# Patient Record
Sex: MALE | Employment: UNEMPLOYED | URBAN - METROPOLITAN AREA
[De-identification: names, ages, dates, MRNs, and addresses within clinical notes are randomized per-mention and may not be internally consistent; named-entity substitution may affect disease eponyms.]

---

## 2024-05-17 ENCOUNTER — OFFICE VISIT (OUTPATIENT)
Dept: FAMILY MEDICINE | Facility: CLINIC | Age: 44
End: 2024-05-17
Payer: COMMERCIAL

## 2024-05-17 VITALS
WEIGHT: 141.1 LBS | DIASTOLIC BLOOD PRESSURE: 83 MMHG | OXYGEN SATURATION: 98 % | HEART RATE: 64 BPM | SYSTOLIC BLOOD PRESSURE: 126 MMHG | RESPIRATION RATE: 18 BRPM | TEMPERATURE: 97.9 F

## 2024-05-17 DIAGNOSIS — L30.9 DERMATITIS: Primary | ICD-10-CM

## 2024-05-17 DIAGNOSIS — J34.89 INFECTION OF NOSE: ICD-10-CM

## 2024-05-17 PROCEDURE — 99203 OFFICE O/P NEW LOW 30 MIN: CPT | Performed by: PHYSICIAN ASSISTANT

## 2024-05-17 RX ORDER — CEPHALEXIN 500 MG/1
500 CAPSULE ORAL 4 TIMES DAILY
Qty: 28 CAPSULE | Refills: 0 | Status: SHIPPED | OUTPATIENT
Start: 2024-05-17 | End: 2024-05-24

## 2024-05-17 RX ORDER — MUPIROCIN 20 MG/G
OINTMENT TOPICAL 3 TIMES DAILY
Qty: 22 G | Refills: 0 | Status: SHIPPED | OUTPATIENT
Start: 2024-05-17 | End: 2024-05-24

## 2024-05-17 NOTE — PROGRESS NOTES
Assessment & Plan     Dermatitis  Pt has a hx of spongiotic dermatitis with intermittant episodes and pigment changes on his face. He requests referral to dermatology as he has not received any care for years and although no current episode has pigment change on the face he would like recommendations for treating as well as management when he is having acute flares.  Referral placed.     - Adult Dermatology  Referral    Infection of nose  Bactroban and keflex. Avoid picking, pulling hairs, waxing   Nasal saline   Follow-up prn    - mupirocin (BACTROBAN) 2 % external ointment  Dispense: 22 g; Refill: 0  - cephALEXin (KEFLEX) 500 MG capsule  Dispense: 28 capsule; Refill: 0         Shirin Olson PA-C  [facility]    Aye Maya is a 44 year old male who presents to clinic today for the following health issues:  Chief Complaint   Patient presents with    Nose Problem     2 days Right side nose painfully and redness.     HPI: pt felt tenderness and a bump he thought may be a pimple on the L nares several days ago, he squeezed without results.  Since has worsened.   Pt presents with 2 day hx of redness, swelling discomfort L nares.   No nasal discharge.    No fevers.    No uri sx.  Waxed nares 2 weeks ago but ipsilateral nose    Denies additional trauma.      Review of Systems  Constitutional, HEENT, cardiovascular, pulmonary, gi and gu systems are negative, except as otherwise noted.      Objective    /83 (BP Location: Right arm, Patient Position: Sitting, Cuff Size: Adult Regular)   Pulse 64   Temp 97.9  F (36.6  C) (Oral)   Resp 18   Wt 64 kg (141 lb 1.6 oz)   SpO2 98%   Physical Exam   Exam of the nose reveals mild erythema, swelling at the tip of the nares on the L with TTP, no discharge. No perforation of the septum.    Brown macular irregular shaped hyperpigmentation R face including lateral nose, maxillary region, temple

## 2024-06-06 PROBLEM — Z00.00 PREVENTATIVE HEALTH CARE: Status: ACTIVE | Noted: 2024-06-06

## 2024-08-09 ENCOUNTER — HOSPITAL ENCOUNTER (EMERGENCY)
Facility: HOSPITAL | Age: 44
Discharge: LEFT BEFORE DISCHARGE | End: 2024-08-09
Admitting: STUDENT IN AN ORGANIZED HEALTH CARE EDUCATION/TRAINING PROGRAM
Payer: COMMERCIAL

## 2024-08-09 VITALS
DIASTOLIC BLOOD PRESSURE: 78 MMHG | BODY MASS INDEX: 20.9 KG/M2 | HEIGHT: 70 IN | OXYGEN SATURATION: 99 % | WEIGHT: 146 LBS | HEART RATE: 55 BPM | TEMPERATURE: 98.9 F | RESPIRATION RATE: 20 BRPM | SYSTOLIC BLOOD PRESSURE: 127 MMHG

## 2024-08-09 PROCEDURE — 99281 EMR DPT VST MAYX REQ PHY/QHP: CPT

## 2024-08-09 ASSESSMENT — COLUMBIA-SUICIDE SEVERITY RATING SCALE - C-SSRS
2. HAVE YOU ACTUALLY HAD ANY THOUGHTS OF KILLING YOURSELF IN THE PAST MONTH?: NO
1. IN THE PAST MONTH, HAVE YOU WISHED YOU WERE DEAD OR WISHED YOU COULD GO TO SLEEP AND NOT WAKE UP?: NO
6. HAVE YOU EVER DONE ANYTHING, STARTED TO DO ANYTHING, OR PREPARED TO DO ANYTHING TO END YOUR LIFE?: NO

## 2024-08-10 NOTE — ED TRIAGE NOTES
Pt arrives to triage for facial swelling and a chronic dermatological condition. He report she puts alcohol on his face and used a new facial cleanser recently and reports his face became swollen after using it. Pt does not appear swollen at this time.